# Patient Record
Sex: FEMALE | Race: WHITE | NOT HISPANIC OR LATINO | URBAN - METROPOLITAN AREA
[De-identification: names, ages, dates, MRNs, and addresses within clinical notes are randomized per-mention and may not be internally consistent; named-entity substitution may affect disease eponyms.]

---

## 2021-08-11 ENCOUNTER — OFFICE (OUTPATIENT)
Dept: URBAN - METROPOLITAN AREA CLINIC 19 | Facility: CLINIC | Age: 81
End: 2021-08-11
Payer: MEDICAID

## 2021-08-11 VITALS
HEART RATE: 71 BPM | HEIGHT: 64 IN | WEIGHT: 149 LBS | DIASTOLIC BLOOD PRESSURE: 47 MMHG | OXYGEN SATURATION: 99 % | SYSTOLIC BLOOD PRESSURE: 105 MMHG

## 2021-08-11 DIAGNOSIS — K80.20 CALCULUS OF GALLBLADDER WITHOUT CHOLECYSTITIS WITHOUT OBSTRU: ICD-10-CM

## 2021-08-11 DIAGNOSIS — K21.9 GASTRO-ESOPHAGEAL REFLUX DISEASE WITHOUT ESOPHAGITIS: ICD-10-CM

## 2021-08-11 DIAGNOSIS — D50.9 IRON DEFICIENCY ANEMIA, UNSPECIFIED: ICD-10-CM

## 2021-08-11 DIAGNOSIS — R93.89 ABNORMAL FINDINGS ON DIAGNOSTIC IMAGING OF OTHER SPECIFIED B: ICD-10-CM

## 2021-08-11 DIAGNOSIS — R63.4 ABNORMAL WEIGHT LOSS: ICD-10-CM

## 2021-08-11 LAB
CBC, PLATELET, NO DIFFERENTIAL: HEMATOCRIT: 28.9 % — LOW (ref 34–46.6)
CBC, PLATELET, NO DIFFERENTIAL: HEMOGLOBIN: 9.2 G/DL — LOW (ref 11.1–15.9)
CBC, PLATELET, NO DIFFERENTIAL: MCH: 30 PG (ref 26.6–33)
CBC, PLATELET, NO DIFFERENTIAL: MCHC: 31.8 G/DL (ref 31.5–35.7)
CBC, PLATELET, NO DIFFERENTIAL: MCV: 94 FL (ref 79–97)
CBC, PLATELET, NO DIFFERENTIAL: NRBC: (no result)
CBC, PLATELET, NO DIFFERENTIAL: PLATELETS: 280 X10E3/UL (ref 150–450)
CBC, PLATELET, NO DIFFERENTIAL: RBC: 3.07 X10E6/UL — LOW (ref 3.77–5.28)
CBC, PLATELET, NO DIFFERENTIAL: RDW: 15.6 % — HIGH (ref 11.7–15.4)
CBC, PLATELET, NO DIFFERENTIAL: WBC: 7 X10E3/UL (ref 3.4–10.8)

## 2021-08-11 PROCEDURE — 99204 OFFICE O/P NEW MOD 45 MIN: CPT

## 2021-08-11 RX ORDER — SODIUM PICOSULFATE, MAGNESIUM OXIDE, AND ANHYDROUS CITRIC ACID 10; 3.5; 12 MG/160ML; G/160ML; G/160ML
LIQUID ORAL
Qty: 1 | Refills: 0 | Status: ACTIVE
Start: 2021-08-11

## 2021-08-11 RX ORDER — PANTOPRAZOLE SODIUM 40 MG/1
40 TABLET, DELAYED RELEASE ORAL
Qty: 30 | Refills: 11 | Status: ACTIVE
Start: 2021-08-11

## 2021-08-11 NOTE — SERVICENOTES
The patient's assessment was reviewed with Dr. Woodward and a collaborative plan of care was established.

## 2021-08-11 NOTE — SERVICEHPINOTES
80-year-old  white female here in a wheelchair with her daughter and grandson for a follow-up visit after her hospitalization at [facility] (severe anemia melena).  She was initially admitted for acute respiratory failure and congestive heart failure with pulmonary edema.  She apparently had significant drop in her blood count (Hgb=5.5) as well as melena.  Reportedly EGD and colonoscopy were recommended, but she refused to have these done.  She received 2 blood transfusions and after she was stable was discharged on 5/7/21 on iron supplementation.  She denies ever having any GI tests or studies done before in the past, including screening colonoscopies.  Her hematocrit upon discharge was 29%.  She has apparently not had any blood work drawn since she has been discharged from the hospital.  She has seen her PCP, Kimmy Hobson MD, but no blood work was drawn and she encouraged her to follow-up with GI.  She also has seen her cardiologist, Aiden Murrell MD, regarding her newly diagnosed congestive heart failure.  When she was hospitalized CT abdomen/pelvis on 5/5/21 was remarkable for right pleural effusion cholelithiasis with dilated common bile duct (1.7 cm) without intrahepatic bile duct dilatation and found a "2.7 cm ovoid relatively low density adrenal mass most likely adrenal adenoma."  Alkaline phosphatase and LFTs were normal.Since being discharged from the hospital, patient denies any additional episodes of melena, or signs of overt GI bleeding (i.e.  Hematochezia, rectal bleeding).  She apparently was previously taking meloxicam for arthritis.  Her reflux seems to be fairly significant and she is currently only taking famotidine 20 milligrams daily.  She denies dysphagia, nausea, vomiting, abdominal pain or overt GI bleeding. She has had unintentional weight loss of around 20-25 pounds in the last 4-5 months.  She reports a mild decrease in her appetite.  Family history is negative for colon neoplasm.